# Patient Record
Sex: FEMALE | Race: WHITE | NOT HISPANIC OR LATINO | Employment: PART TIME | URBAN - NONMETROPOLITAN AREA
[De-identification: names, ages, dates, MRNs, and addresses within clinical notes are randomized per-mention and may not be internally consistent; named-entity substitution may affect disease eponyms.]

---

## 2017-01-23 ENCOUNTER — HISTORY (OUTPATIENT)
Dept: FAMILY MEDICINE | Facility: OTHER | Age: 32
End: 2017-01-23

## 2017-01-23 ENCOUNTER — OFFICE VISIT - GICH (OUTPATIENT)
Dept: FAMILY MEDICINE | Facility: OTHER | Age: 32
End: 2017-01-23

## 2017-01-23 DIAGNOSIS — R05.9 COUGH: ICD-10-CM

## 2017-01-23 DIAGNOSIS — J32.9 CHRONIC SINUSITIS: ICD-10-CM

## 2017-01-23 DIAGNOSIS — B97.89 OTHER VIRAL AGENTS AS THE CAUSE OF DISEASES CLASSIFIED ELSEWHERE: ICD-10-CM

## 2017-01-23 DIAGNOSIS — A08.4 VIRAL INTESTINAL INFECTION: ICD-10-CM

## 2017-01-23 DIAGNOSIS — J45.20 MILD INTERMITTENT ASTHMA, UNCOMPLICATED: ICD-10-CM

## 2017-01-26 ENCOUNTER — HISTORY (OUTPATIENT)
Dept: FAMILY MEDICINE | Facility: OTHER | Age: 32
End: 2017-01-26

## 2017-01-26 ENCOUNTER — OFFICE VISIT - GICH (OUTPATIENT)
Dept: FAMILY MEDICINE | Facility: OTHER | Age: 32
End: 2017-01-26

## 2017-01-26 DIAGNOSIS — R19.7 DIARRHEA: ICD-10-CM

## 2017-01-26 DIAGNOSIS — J01.10 ACUTE FRONTAL SINUSITIS: ICD-10-CM

## 2017-01-26 DIAGNOSIS — R05.9 COUGH: ICD-10-CM

## 2017-01-26 DIAGNOSIS — J01.00 ACUTE MAXILLARY SINUSITIS: ICD-10-CM

## 2017-01-26 DIAGNOSIS — J01.20 ACUTE ETHMOIDAL SINUSITIS: ICD-10-CM

## 2018-01-03 NOTE — PATIENT INSTRUCTIONS
Patient Information     Patient Name MRN Liza Flynn [id number] Female 1985      Patient Instructions by Jennifer Peña PA-C at 2017  9:45 AM     Author:  Jennifer Peña PA-C Service:  (none) Author Type:  PHYS- Physician Assistant     Filed:  2017 10:10 AM Encounter Date:  2017 Status:  Signed     :  Jennifer Peña PA-C (PHYS- Physician Assistant)            Antibiotic has been sent to pharmacy. Please take full course of antibiotic even if symptoms have completely resolved. This helps prevent against antibiotic resistance.     Patient prescribed antibiotics. Monitor for any fevers or chills. Return in 7-10 days if not feeling better. Please call clinic with any questions or concerns. Please take in a lot of fluids and get rest. Return with any change or worsening of symptoms.    May use symptomatic care with tylenol or ibuprofen. Sudafed or mucinex work well for congestion. May use cough syrup or cough drops.  Using a humidifier works well to break up the congestion. You can also sleep propped up on a couple pillows to decrease symptoms at night. A nettipot works well to decrease nasal congestion.    Use a Neti Pot/sinus flush (Scott Med Sinus Rinse) 3 times daily to irrigate sinuses/mucosal tissue.     Sudafed or mucinex work well for congestion.   If you choose pseudoephedrine, use for only 5-7 days AS DIRECTED. Speak to your pharmacist if you have any concerns about your medications. May also use decongestant nasal spray, but only for 3 days MAXIMUM.    You will need to be evaluated if you start to experience:  Fever higher than 102.5 F (39.2 C)   Sudden and severe pain in the face and head   Trouble seeing or seeing double   Trouble thinking clearly   Swelling or redness around 1 or both eyes   Trouble breathing or a stiff neck    * If you are a smoker, try to quit *    Call  or go to the emergency room if you:  Have trouble breathing   Are drooling because  you cannot swallow your saliva   Have swelling of the neck or tongue   Cannot move your neck or have trouble opening your mouth

## 2018-01-03 NOTE — NURSING NOTE
Patient Information     Patient Name MRN Sex Liza Brink [id number] Female 1985      Nursing Note by Gerardo Weems at 2017  9:45 AM     Author:  Gerardo Weems Service:  (none) Author Type:  (none)     Filed:  2017 10:02 AM Encounter Date:  2017 Status:  Signed     :  Gerardo Weems            Pt here today for cough and some diarrhea. Pt was seen on  and is getting worse. Pt will need a work note today as well. Pt ears are sore and pt has itchy eyes.  Gerardo Weems LPN .............2017  9:51 AM

## 2018-01-03 NOTE — PROGRESS NOTES
Patient Information     Patient Name MRN Sex Liza Brink [id number] Female 1985      Progress Notes by Jennifer Peña PA-C at 2017  9:45 AM     Author:  Jennifer Peña PA-C Service:  (none) Author Type:  PHYS- Physician Assistant     Filed:  2017 10:16 AM Encounter Date:  2017 Status:  Signed     :  Jennifer Peña PA-C (PHYS- Physician Assistant)            Nursing Notes:   Gerardo Weems  2017 10:02 AM  Signed  Pt here today for cough and some diarrhea. Pt was seen on  and is getting worse. Pt will need a work note today as well. Pt ears are sore and pt has itchy eyes.  Gerardo Weems LPN .............2017  9:51 AM      HPI:    Liza Chaudhari is a 31 y.o. female who presents for cough and some diarrhea. Pt was seen on  and is getting worse. Pt will need a work note today as well. Pt ears are sore and pt has itchy eyes. Cold, clammy, ear pain. Felt warm.  HA.  Stuffy. Sinus pain.  Runny nose.  Coughing.  Chest tight.  Some SOB.  No vomiting.  Nausea.  Little diarrhea.  Diarrhea 3/day.  Ears irritable. Vomited previously. Upset stomach. Sinus pain persistent last few days. Congestion in lungs - coughing. Hx asthma. Coughing up phlegm. No new foods, meds. Need work note. No dysuria, hematuria, blood in stool.     No change in visual acuity, no photophobia, no severe eye pain.    No significant prior ophthalmological history. Patient  does not wear contact lenses.    No past medical history on file.    No past surgical history on file.    Social History        Substance Use Topics          Smoking status:   Current Every Day Smoker      Packs/day:  0.75      Types:  Cigarettes      Smokeless tobacco:   Never Used      Alcohol use   0.0 oz/week     0 Standard drinks or equivalent per week        Comment: rare         Current Outpatient Prescriptions       Medication  Sig Dispense Refill     albuterol HFA 90 mcg/actuation inhaler Inhale 2 Puffs by mouth every  "4 hours if needed. 1 Inhaler 1     No current facility-administered medications for this visit.      Medications have been reviewed by me and are current to the best of my knowledge and ability.      No Known Allergies    REVIEW OF SYSTEMS:  Refer to HPI.    EXAM:   Vitals:    /80  Pulse 76  Temp 96.8  F (36  C) (Tympanic)  Ht 1.615 m (5' 3.6\")  Wt 80.9 kg (178 lb 6.4 oz)  LMP 01/19/2017  SpO2 97%  BMI 31.01 kg/m2    General Appearance: Pleasant, alert, appropriate appearance for age. No acute distress  Eye Exam:  Normal external eye, conjunctiva, lids, cornea. RICARDO. No foreign body noted in eye or beneath eyelids. No periorbital cellulitis or swelling. The corneas are clear. No drainage or pustule.    Ear Exam: Normal TM's bilaterally, grey, translucent, bony landmarks appreciated.   Left/Right TM: Effusion is not present. TM is not bulging. There is no pus appreciated.    Normal auditory canals and external ears. Non-tender.   OroPharynx Exam:  Erythematous posterior pharynx with no exudates. Sinus pain upon palpation of frontal, ethmoid, and maxillary sinuses.  Chest/Respiratory Exam: Normal chest wall and respirations. Clear to auscultation. No retractions appreciated.  Cardiovascular Exam: Regular rate and rhythm. S1, S2, no murmur, click, gallop, or rubs.  Lymphatic Exam: ACLN.  Skin: no rash or abnormalities  Gastrointestinal Exam: Soft, no masses or organomegaly. Abdomen mildly tender throughout 4 quadrants. Normal BS x 4.  No CVA tenderness to palpation. No rebound tenderness or guarding.  Psychiatric Exam: Alert and oriented - appropriate affect.    PHQ Depression Screen  Date of PHQ exam: 01/26/17  Over the last 2 weeks, how often have you been bothered by any of the following problems?  1. Little interest or pleasure in doing things: 0 - Not at all  2. Feeling down, depressed, or hopeless: 0 - Not at all       No results found for this or any previous visit.    ASSESSMENT AND PLAN:      ICD-10-CM  "   1. Acute non-recurrent frontal sinusitis J01.10 azithromycin (ZITHROMAX) 250 mg tablet   2. Acute non-recurrent ethmoidal sinusitis J01.20 azithromycin (ZITHROMAX) 250 mg tablet   3. Acute non-recurrent maxillary sinusitis J01.00 azithromycin (ZITHROMAX) 250 mg tablet   4. Cough R05 azithromycin (ZITHROMAX) 250 mg tablet   5. Diarrhea, unspecified type R19.7        Started on azithromycin for an acute sinus infection.     Diarrhea -   Try small amounts of food and drink frequently. Offer a bland diet. Advance as tolerated.  Avoid dairy products the first day. You may add yogurt tomorrow as the first dairy product.    Try the BRAT diet (bread, bananas, rice, applesauce, tea, toast).  This is a very bland diet which should not irritate your colon.  Hold off on spicy foods, red sauces, mexican or chinese food.    Call or return to clinic as needed if your symptoms worsen or fail to improve as anticipated.     Should I see a doctor or nurse about my stomach ache? -- Most people do not need to see a doctor or nurse for a stomach ache. But you should see your doctor or nurse if:  ?You have bloody bowel movements, diarrhea, or vomiting  ?Your pain is severe and lasts more than an hour or comes and goes for more than 24 hours  ?You cannot eat or drink for hours  ?You have a fever higher than 102 F (39 C)  ?You lose a lot of weight without trying to, or lose interest in food     Patient Instructions   Antibiotic has been sent to pharmacy. Please take full course of antibiotic even if symptoms have completely resolved. This helps prevent against antibiotic resistance.     Patient prescribed antibiotics. Monitor for any fevers or chills. Return in 7-10 days if not feeling better. Please call clinic with any questions or concerns. Please take in a lot of fluids and get rest. Return with any change or worsening of symptoms.    May use symptomatic care with tylenol or ibuprofen. Sudafed or mucinex work well for congestion. May  use cough syrup or cough drops.  Using a humidifier works well to break up the congestion. You can also sleep propped up on a couple pillows to decrease symptoms at night. A nettipot works well to decrease nasal congestion.    Use a Neti Pot/sinus flush (Scott Med Sinus Rinse) 3 times daily to irrigate sinuses/mucosal tissue.     Sudafed or mucinex work well for congestion.   If you choose pseudoephedrine, use for only 5-7 days AS DIRECTED. Speak to your pharmacist if you have any concerns about your medications. May also use decongestant nasal spray, but only for 3 days MAXIMUM.    You will need to be evaluated if you start to experience:  Fever higher than 102.5 F (39.2 C)   Sudden and severe pain in the face and head   Trouble seeing or seeing double   Trouble thinking clearly   Swelling or redness around 1 or both eyes   Trouble breathing or a stiff neck    * If you are a smoker, try to quit *    Call 9-1-1 or go to the emergency room if you:  Have trouble breathing   Are drooling because you cannot swallow your saliva   Have swelling of the neck or tongue   Cannot move your neck or have trouble opening your mouth        Jennifer Peña PA-C..................1/26/2017 9:56 AM

## 2018-01-03 NOTE — PATIENT INSTRUCTIONS
Patient Information     Patient Name MRN Liza Flynn [id number] Female 1985      Patient Instructions by Jennifer Peña PA-C at 2017 10:15 AM     Author:  Jennifer Peña PA-C Service:  (none) Author Type:  PHYS- Physician Assistant     Filed:  2017 10:39 AM Encounter Date:  2017 Status:  Signed     :  Jennifer Peña PA-C (PHYS- Physician Assistant)            Viral stomach infection -   Try small amounts of food and drink frequently. Offer a bland diet. Advance as tolerated.  Avoid dairy products the first day. You may add yogurt tomorrow as the first dairy product.    Try the BRAT diet (bread, bananas, rice, applesauce, tea, toast).  This is a very bland diet which should not irritate your colon.  Hold off on spicy foods, red sauces, mexican or chinese food.    Call or return to clinic as needed if your symptoms worsen or fail to improve as anticipated.     Should I see a doctor or nurse about my stomach ache? -- Most people do not need to see a doctor or nurse for a stomach ache. But you should see your doctor or nurse if:  ?You have bloody bowel movements, diarrhea, or vomiting  ?Your pain is severe and lasts more than an hour or comes and goes for more than 24 hours  ?You cannot eat or drink for hours  ?You have a fever higher than 102 F (39 C)  ?You lose a lot of weight without trying to, or lose interest in food     Viral sinus infection -   Symptoms due to virus. No antibiotic is needed at this time. Symptoms typically worse on days 3-4 and then begin improving each day. If symptoms begin worsening or fail to improve after 10 days, return to clinic for reevaluation.     May use symptomatic care with tylenol or ibuprofen. Sudafed or mucinex work well for congestion. May use cough syrup or cough drops.  Using a humidifier works well to break up the congestion. You can also sleep propped up on a couple pillows to decrease symptoms at night.    Please take tylenol as  needed up to 4 times daily.  Treat symptomatically with warm salt water gargles.  Lozenges, Tylenol, Advil or Aleve as needed. Frequent swallows of cool liquid.  Oatmeal coats the throat and some patients find it soothes the pain. Encouraged warm teas or fluids with honey.     If you have sinus congestion -  Use a Neti Pot/sinus flush (Scott Med Sinus Rinse) 3 times daily to irrigate sinuses/mucosal tissue.     Monitor for any fevers or chills. Return in 7-10 days if not feeling better. Please call clinic with any questions or concerns. Return to clinic with change/worsening of symptoms.   Encouraged fluids and rest.    Call 9-1-1 or go to the emergency room if you:  Have trouble breathing   Are drooling because you cannot swallow your saliva   Have swelling of the neck or tongue   Cannot move your neck or have trouble opening your mouth

## 2018-01-03 NOTE — NURSING NOTE
Patient Information     Patient Name MRN Liza Flynn [id number] Female 1985      Nursing Note by Gerardo Weems at 2017 10:15 AM     Author:  Gerardo Weems Service:  (none) Author Type:  (none)     Filed:  2017 10:31 AM Encounter Date:  2017 Status:  Signed     :  Gerardo Weems            Pt here today for flu like symptoms. This has been going on for a week. Pt has ha diarrhea but seems to be getting better, pt vomited this morning. She feels aches and has had upper raspatory issues as well. Pt would like an Rx for albuterol.  Gerardo Weems LPN .............2017  10:23 AM

## 2018-01-03 NOTE — PROGRESS NOTES
Patient Information     Patient Name MRN Sex     Liza Chaudhari [id number] Female 1985      Progress Notes by Jennifer Peña PA-C at 2017 10:15 AM     Author:  Jennifer Peña PA-C Service:  (none) Author Type:  PHYS- Physician Assistant     Filed:  2017 10:46 AM Encounter Date:  2017 Status:  Signed     :  Jennifer Peña PA-C (PHYS- Physician Assistant)            Nursing Notes:   Gerardo Weems  2017 10:31 AM  Signed  Pt here today for flu like symptoms. This has been going on for a week. Pt has ha diarrhea but seems to be getting better, pt vomited this morning. She feels aches and has had upper raspatory issues as well. Pt would like an Rx for albuterol.  Gerardo Weems LPN .............2017  10:23 AM      HPI:    Liza Chaudhari is a 31 y.o. female who presents for flu like symptoms. This has been going on for a week. Pt has ha diarrhea but seems to be getting better, pt vomited this morning. She feels aches and has had upper respiratory issues as well. Pt would like an Rx for albuterol. Upset stomach.  Warm sweats, chills. Slight HA.  Stomach symptoms started this morning.  Symptoms off and on x 1 month.  Still have slight congestion in lungs - getting better. Hx asthma. Out of albuterol . Sometimes use a couple times daily. Little ear pain/irritation. Little sinus pain/pressure. Coughing up green phlegm x couple days. Diarrhea - couple times this morning.  No new foods, meds. [place] [employer] [place] yesterday.  Need work note.     No past medical history on file.    No past surgical history on file.    Social History        Substance Use Topics          Smoking status:   Current Every Day Smoker      Packs/day:  0.75      Types:  Cigarettes      Smokeless tobacco:   Never Used      Alcohol use   0.0 oz/week     0 Standard drinks or equivalent per week        Comment: rare         No current outpatient prescriptions on file.     No current  "facility-administered medications for this visit.      Medications have been reviewed by me and are current to the best of my knowledge and ability.      No Known Allergies    REVIEW OF SYSTEMS:  Refer to HPI.    EXAM:   Vitals:    /68  Pulse 64  Temp 97.8  F (36.6  C) (Oral)  Ht 1.615 m (5' 3.6\")  Wt 79.4 kg (175 lb)  LMP 01/19/2017  BMI 30.42 kg/m2    General Appearance: Pleasant, alert, appropriate appearance for age. No acute distress  Ear Exam: Normal TM's bilaterally, grey, translucent, bony landmarks appreciated.   Left/Right TM: Effusion is not present. TM is not bulging. There is no pus appreciated.    Normal auditory canals and external ears. Non-tender.   Nose Exam: Normal external nose, mucus membranes, and septum.  OroPharynx Exam:  Non-erythematous posterior pharynx with no exudates. No sinus pain upon palpation of frontal, ethmoid, and maxillary sinuses.  Chest/Respiratory Exam: Normal chest wall and respirations. Clear to auscultation. No retractions appreciated.  Cardiovascular Exam: Regular rate and rhythm. S1, S2, no murmur, click, gallop, or rubs.  Lymphatic Exam: Neg.  Skin: no rash or abnormalities  Gastrointestinal Exam: Soft, no masses or organomegaly. Abdomen non-tender. Normal BS x 4.  No CVA tenderness to palpation. No rebound tenderness or guarding.  Psychiatric Exam: Alert and oriented - appropriate affect.    PHQ Depression Screen  Date of PHQ exam: 01/23/17  Over the last 2 weeks, how often have you been bothered by any of the following problems?  1. Little interest or pleasure in doing things: 0 - Not at all  2. Feeling down, depressed, or hopeless: 0 - Not at all       LABS:    No results found for this visit on 01/23/17.    ASSESSMENT AND PLAN:      ICD-10-CM    1. Viral gastroenteritis A08.4    2. Cough R05 albuterol HFA 90 mcg/actuation inhaler   3. Viral sinusitis J32.9      B97.89    4. Mild intermittent asthma without complication J45.20 albuterol HFA 90 mcg/actuation " inhaler       Refilled albuterol inhaler.   Symptoms viral. No antibiotics warranted at this time.   Viral gastroenteritis and sinus infection.   Return to clinic with change/worsening of symptoms.   Gave work note.     Patient Instructions   Viral stomach infection -   Try small amounts of food and drink frequently. Offer a bland diet. Advance as tolerated.  Avoid dairy products the first day. You may add yogurt tomorrow as the first dairy product.    Try the BRAT diet (bread, bananas, rice, applesauce, tea, toast).  This is a very bland diet which should not irritate your colon.  Hold off on spicy foods, red sauces, mexican or chinese food.    Call or return to clinic as needed if your symptoms worsen or fail to improve as anticipated.     Should I see a doctor or nurse about my stomach ache? -- Most people do not need to see a doctor or nurse for a stomach ache. But you should see your doctor or nurse if:  ?You have bloody bowel movements, diarrhea, or vomiting  ?Your pain is severe and lasts more than an hour or comes and goes for more than 24 hours  ?You cannot eat or drink for hours  ?You have a fever higher than 102 F (39 C)  ?You lose a lot of weight without trying to, or lose interest in food     Viral sinus infection -   Symptoms due to virus. No antibiotic is needed at this time. Symptoms typically worse on days 3-4 and then begin improving each day. If symptoms begin worsening or fail to improve after 10 days, return to clinic for reevaluation.     May use symptomatic care with tylenol or ibuprofen. Sudafed or mucinex work well for congestion. May use cough syrup or cough drops.  Using a humidifier works well to break up the congestion. You can also sleep propped up on a couple pillows to decrease symptoms at night.    Please take tylenol as needed up to 4 times daily.  Treat symptomatically with warm salt water gargles.  Lozenges, Tylenol, Advil or Aleve as needed. Frequent swallows of cool liquid.   Oatmeal coats the throat and some patients find it soothes the pain. Encouraged warm teas or fluids with honey.     If you have sinus congestion -  Use a Neti Pot/sinus flush (Scott Med Sinus Rinse) 3 times daily to irrigate sinuses/mucosal tissue.     Monitor for any fevers or chills. Return in 7-10 days if not feeling better. Please call clinic with any questions or concerns. Return to clinic with change/worsening of symptoms.   Encouraged fluids and rest.    Call 9-1-1 or go to the emergency room if you:  Have trouble breathing   Are drooling because you cannot swallow your saliva   Have swelling of the neck or tongue   Cannot move your neck or have trouble opening your mouth          Jennifer Peña PA-C..................1/23/2017 10:30 AM

## 2018-01-16 PROBLEM — J45.20 MILD INTERMITTENT ASTHMA WITHOUT COMPLICATION: Status: ACTIVE | Noted: 2017-01-23

## 2018-01-16 RX ORDER — ALBUTEROL SULFATE 90 UG/1
2 AEROSOL, METERED RESPIRATORY (INHALATION)
COMMUNITY
Start: 2017-01-23

## 2018-01-25 VITALS
HEIGHT: 64 IN | TEMPERATURE: 96.8 F | DIASTOLIC BLOOD PRESSURE: 80 MMHG | BODY MASS INDEX: 30.46 KG/M2 | HEART RATE: 76 BPM | OXYGEN SATURATION: 97 % | WEIGHT: 178.4 LBS | SYSTOLIC BLOOD PRESSURE: 112 MMHG

## 2018-01-25 VITALS
HEIGHT: 64 IN | HEART RATE: 64 BPM | SYSTOLIC BLOOD PRESSURE: 130 MMHG | WEIGHT: 175 LBS | BODY MASS INDEX: 29.88 KG/M2 | DIASTOLIC BLOOD PRESSURE: 68 MMHG | TEMPERATURE: 97.8 F

## 2018-07-23 NOTE — PROGRESS NOTES
Patient Information     Patient Name  Liza Chaudhari MRN  [id number] Sex  Female   1985      Letter by Jennifer Peañ PA-C at      Author:  Jennifer Peña PA-C Service:  (none) Author Type:  (none)    Filed:   Encounter Date:  2017 Status:  (Other)           Liza Chaudhari  [address]  [facility] [address]          2017      CERTIFICATE TO RETURN TO WORK      Liza Chaudhari has been under my care from 2017  through 2017 and is able to return to work on 2017.      Remarks: Please excuse from work today 2017.     Sincerely,  Jennifer Peña PA-C ....................  2017   10:10 AM

## 2018-07-23 NOTE — PROGRESS NOTES
Patient Information     Patient Name  Liza Chaudhari MRN  [id number] Sex  Female   1985      Letter by Jennifer Peña PA-C at      Author:  Jennifer Peña PA-C Service:  (none) Author Type:  (none)    Filed:   Encounter Date:  2017 Status:  (Other)           Liza Chaudhari  [address]  [facility] [address]          2017      CERTIFICATE TO RETURN TO WORK      Liza Chaudhari has been under my care from 2017  through 2017 and is able to return to work on 2017.      Remarks: Please excuse from work on 2017. Patient was diagnosed with a viral stomach infection.     Sincerely,  Jennifer Peña PA-C ....................  2017   10:40 AM

## 2020-05-09 ENCOUNTER — HOSPITAL ENCOUNTER (EMERGENCY)
Facility: CLINIC | Age: 35
Discharge: HOME | End: 2020-05-10
Attending: FAMILY MEDICINE | Admitting: FAMILY MEDICINE
Payer: COMMERCIAL

## 2020-05-09 VITALS
OXYGEN SATURATION: 98 % | BODY MASS INDEX: 32.16 KG/M2 | DIASTOLIC BLOOD PRESSURE: 70 MMHG | SYSTOLIC BLOOD PRESSURE: 134 MMHG | TEMPERATURE: 98.4 F | WEIGHT: 185 LBS

## 2020-05-09 DIAGNOSIS — L23.7 CONTACT DERMATITIS DUE TO POISON IVY: ICD-10-CM

## 2020-05-09 PROCEDURE — 99284 EMERGENCY DEPT VISIT MOD MDM: CPT | Mod: Z6 | Performed by: NURSE PRACTITIONER

## 2020-05-09 PROCEDURE — 99283 EMERGENCY DEPT VISIT LOW MDM: CPT | Performed by: NURSE PRACTITIONER

## 2020-05-09 RX ORDER — HYDROXYZINE PAMOATE 25 MG/1
25 CAPSULE ORAL 4 TIMES DAILY PRN
Qty: 20 CAPSULE | Refills: 0 | Status: SHIPPED | OUTPATIENT
Start: 2020-05-09

## 2020-05-09 RX ORDER — PREDNISONE 10 MG/1
TABLET ORAL
Qty: 32 TABLET | Refills: 0 | Status: SHIPPED | OUTPATIENT
Start: 2020-05-09 | End: 2020-05-19

## 2020-05-09 NOTE — ED AVS SNAPSHOT
[facility] Emergency Department  [address] [place] [address]  Phone:  [phone]  Fax:  [phone]                                    Liza Chaudhari   MRN: [id number]    Department:  [facility] Emergency Department   Date of Visit:  5/9/2020           After Visit Summary Signature Page    I have received my discharge instructions, and my questions have been answered. I have discussed any challenges I see with this plan with the nurse or doctor.    ..........................................................................................................................................  Patient/Patient Representative Signature      ..........................................................................................................................................  Patient Representative Print Name and Relationship to Patient    ..................................................               ................................................  Date                                   Time    ..........................................................................................................................................  Reviewed by Signature/Title    ...................................................              ..............................................  Date                                               Time          22EPIC Rev 08/18

## 2020-05-10 NOTE — ED PROVIDER NOTES
History     Chief Complaint   Patient presents with     Poison Landy     HPI  Liza Chaudhari is a 35 year old female who presents to the ED today with itching and rash to bilateral arms that is spreading up the arms, patient was working out in the woods and thinks that she has poison ivy.  Patient has been taking Benadryl and using calamine lotion with some relief in her symptoms.  Patient denies any other complaints today.    Allergies:  No Known Allergies    Problem List:    Patient Active Problem List    Diagnosis Date Noted     Mild intermittent asthma without complication 01/23/2017     Priority: Medium     Back pain 06/02/2015     Priority: Medium     Impingement syndrome, shoulder 06/02/2015     Priority: Medium     CARDIOVASCULAR SCREENING; LDL GOAL LESS THAN 160 02/21/2014     Priority: Medium     Hyperhydrosis disorder      Priority: Medium     Carpal tunnel syndrome      Priority: Medium        Past Medical History:    History reviewed. No pertinent past medical history.    Past Surgical History:    History reviewed. No pertinent surgical history.    Family History:    History reviewed. No pertinent family history.    Social History:  Marital Status:  Single [1]  Social History     Tobacco Use     Smoking status: Current Every Day Smoker     Packs/day: 0.75     Types: Cigarettes     Smokeless tobacco: Never Used   Substance Use Topics     Alcohol use: Yes     Alcohol/week: 0.0 standard drinks     Comment: Alcoholic Drinks/day: rare     Drug use: Unknown     Types: Other     Comment: Drug use: No        Medications:    hydrOXYzine (VISTARIL) 25 MG capsule  predniSONE (DELTASONE) 10 MG tablet  albuterol (PROAIR HFA, PROVENTIL HFA, VENTOLIN HFA) 108 (90 BASE) MCG/ACT inhaler  albuterol (PROAIR HFA, PROVENTIL HFA, VENTOLIN HFA) 108 (90 BASE) MCG/ACT inhaler  albuterol (PROAIR HFA/PROVENTIL HFA/VENTOLIN HFA) 108 (90 BASE) MCG/ACT Inhaler  gabapentin (NEURONTIN) 300 MG capsule  HYDROcodone-acetaminophen (NORCO)  5-325 MG per tablet          Review of Systems   All other systems reviewed and are negative.      Physical Exam   BP: 134/70  Temp: 98.4  F (36.9  C)  Weight: 83.9 kg (185 lb)  SpO2: 98 %      Physical Exam  Constitutional:       Appearance: Normal appearance.   HENT:      Head: Normocephalic.      Nose: Nose normal.      Mouth/Throat:      Mouth: Mucous membranes are moist.   Eyes:      Extraocular Movements: Extraocular movements intact.   Neck:      Musculoskeletal: Normal range of motion.   Cardiovascular:      Rate and Rhythm: Normal rate.   Pulmonary:      Effort: Pulmonary effort is normal.   Musculoskeletal: Normal range of motion.   Skin:     General: Skin is warm.      Capillary Refill: Capillary refill takes less than 2 seconds.      Findings: Rash (Maculopapular rash to bilateral forearms consistent with contact dermatitis, no petechiae, some breakdown from itching.  No urticaria) present.   Neurological:      General: No focal deficit present.      Mental Status: She is alert.   Psychiatric:         Mood and Affect: Mood normal.         ED Course        Procedures          No results found for this or any previous visit (from the past 24 hour(s)).    Medications - No data to display    Assessments & Plan (with Medical Decision Making)  Contact dermatitis secondary to poison ivy exposure.  Start prednisone taper, patient prescribed Vistaril in place of Benadryl as needed for itching.  Continue with calamine lotion/hydrocortisone cream/Zanfel as needed.  Follow-up with primary care on Tuesday as scheduled, reasons to return to the emergency room discussed, patient agreeable to plan of care discharged stable condition.     I have reviewed the nursing notes.    I have reviewed the findings, diagnosis, plan and need for follow up with the patient.      New Prescriptions    HYDROXYZINE (VISTARIL) 25 MG CAPSULE    Take 1 capsule (25 mg) by mouth 4 times daily as needed for itching    PREDNISONE (DELTASONE) 10  MG TABLET    Take 4 tablets daily for 5 days,  take 2 tablets daily for 3 days, take 1 tablet daily for 3 days, take half a tablet for 3 days.       Final diagnoses:   Contact dermatitis due to poison ivy       5/9/2020   [facility] EMERGENCY DEPARTMENT     Luz Treviño, APRN CNP  05/09/20 4204

## 2020-05-10 NOTE — ED TRIAGE NOTES
Pt with poison ivy like rash on hands and forearms. Was doing some landscaping the other day and rash is spreading quickly.

## 2022-04-16 PROCEDURE — 99284 EMERGENCY DEPT VISIT MOD MDM: CPT | Performed by: FAMILY MEDICINE

## 2022-04-16 PROCEDURE — 99284 EMERGENCY DEPT VISIT MOD MDM: CPT | Mod: 25 | Performed by: FAMILY MEDICINE

## 2022-04-17 ENCOUNTER — HOSPITAL ENCOUNTER (EMERGENCY)
Facility: CLINIC | Age: 37
Discharge: HOME | End: 2022-04-17
Attending: FAMILY MEDICINE | Admitting: FAMILY MEDICINE
Payer: COMMERCIAL

## 2022-04-17 VITALS
OXYGEN SATURATION: 96 % | RESPIRATION RATE: 16 BRPM | SYSTOLIC BLOOD PRESSURE: 110 MMHG | WEIGHT: 197.2 LBS | BODY MASS INDEX: 34.28 KG/M2 | DIASTOLIC BLOOD PRESSURE: 58 MMHG | HEART RATE: 92 BPM | TEMPERATURE: 98.4 F

## 2022-04-17 DIAGNOSIS — H53.8 BLURRED VISION: ICD-10-CM

## 2022-04-17 DIAGNOSIS — R29.898 BILATERAL LEG WEAKNESS: ICD-10-CM

## 2022-04-17 DIAGNOSIS — R51.9 INTRACTABLE HEADACHE, UNSPECIFIED CHRONICITY PATTERN, UNSPECIFIED HEADACHE TYPE: ICD-10-CM

## 2022-04-17 LAB
ALBUMIN SERPL-MCNC: 3.5 G/DL (ref 3.4–5)
ALBUMIN UR-MCNC: NEGATIVE MG/DL
ALP SERPL-CCNC: 77 U/L (ref 40–150)
ALT SERPL W P-5'-P-CCNC: 16 U/L (ref 0–50)
ANION GAP SERPL CALCULATED.3IONS-SCNC: 5 MMOL/L (ref 3–14)
APPEARANCE UR: ABNORMAL
AST SERPL W P-5'-P-CCNC: 12 U/L (ref 0–45)
BACTERIA #/AREA URNS HPF: ABNORMAL /HPF
BASOPHILS # BLD AUTO: 0.1 10E3/UL (ref 0–0.2)
BASOPHILS NFR BLD AUTO: 0 %
BILIRUB SERPL-MCNC: 0.2 MG/DL (ref 0.2–1.3)
BILIRUB UR QL STRIP: NEGATIVE
BUN SERPL-MCNC: 20 MG/DL (ref 7–30)
CALCIUM SERPL-MCNC: 8.3 MG/DL (ref 8.5–10.1)
CHLORIDE BLD-SCNC: 111 MMOL/L (ref 94–109)
CO2 SERPL-SCNC: 22 MMOL/L (ref 20–32)
COLOR UR AUTO: YELLOW
CREAT SERPL-MCNC: 0.82 MG/DL (ref 0.52–1.04)
CRP SERPL-MCNC: <2.9 MG/L (ref 0–8)
EOSINOPHIL # BLD AUTO: 0.6 10E3/UL (ref 0–0.7)
EOSINOPHIL NFR BLD AUTO: 4 %
ERYTHROCYTE [DISTWIDTH] IN BLOOD BY AUTOMATED COUNT: 12.8 % (ref 10–15)
ERYTHROCYTE [SEDIMENTATION RATE] IN BLOOD BY WESTERGREN METHOD: 10 MM/HR (ref 0–20)
FLUAV RNA SPEC QL NAA+PROBE: NEGATIVE
FLUBV RNA RESP QL NAA+PROBE: NEGATIVE
GFR SERPL CREATININE-BSD FRML MDRD: >90 ML/MIN/1.73M2
GLUCOSE BLD-MCNC: 95 MG/DL (ref 70–99)
GLUCOSE UR STRIP-MCNC: NEGATIVE MG/DL
HCT VFR BLD AUTO: 36.5 % (ref 35–47)
HGB BLD-MCNC: 12.5 G/DL (ref 11.7–15.7)
HGB UR QL STRIP: NEGATIVE
IMM GRANULOCYTES # BLD: 0.1 10E3/UL
IMM GRANULOCYTES NFR BLD: 1 %
KETONES UR STRIP-MCNC: NEGATIVE MG/DL
LEUKOCYTE ESTERASE UR QL STRIP: NEGATIVE
LYMPHOCYTES # BLD AUTO: 3 10E3/UL (ref 0.8–5.3)
LYMPHOCYTES NFR BLD AUTO: 21 %
MCH RBC QN AUTO: 33.1 PG (ref 26.5–33)
MCHC RBC AUTO-ENTMCNC: 34.2 G/DL (ref 31.5–36.5)
MCV RBC AUTO: 97 FL (ref 78–100)
MONOCYTES # BLD AUTO: 0.8 10E3/UL (ref 0–1.3)
MONOCYTES NFR BLD AUTO: 6 %
MUCOUS THREADS #/AREA URNS LPF: PRESENT /LPF
NEUTROPHILS # BLD AUTO: 9.7 10E3/UL (ref 1.6–8.3)
NEUTROPHILS NFR BLD AUTO: 68 %
NITRATE UR QL: NEGATIVE
NRBC # BLD AUTO: 0 10E3/UL
NRBC BLD AUTO-RTO: 0 /100
PH UR STRIP: 5 [PH] (ref 5–7)
PLATELET # BLD AUTO: 276 10E3/UL (ref 150–450)
POTASSIUM BLD-SCNC: 3.8 MMOL/L (ref 3.4–5.3)
PROT SERPL-MCNC: 6.7 G/DL (ref 6.8–8.8)
RBC # BLD AUTO: 3.78 10E6/UL (ref 3.8–5.2)
RBC URINE: <1 /HPF
SARS-COV-2 RNA RESP QL NAA+PROBE: NEGATIVE
SODIUM SERPL-SCNC: 138 MMOL/L (ref 133–144)
SP GR UR STRIP: 1.02 (ref 1–1.03)
SQUAMOUS EPITHELIAL: 4 /HPF
TSH SERPL DL<=0.005 MIU/L-ACNC: 1.65 MU/L (ref 0.4–4)
UROBILINOGEN UR STRIP-MCNC: NORMAL MG/DL
WBC # BLD AUTO: 14.2 10E3/UL (ref 4–11)
WBC URINE: 3 /HPF

## 2022-04-17 PROCEDURE — 87636 SARSCOV2 & INF A&B AMP PRB: CPT | Performed by: FAMILY MEDICINE

## 2022-04-17 PROCEDURE — 96375 TX/PRO/DX INJ NEW DRUG ADDON: CPT | Performed by: FAMILY MEDICINE

## 2022-04-17 PROCEDURE — 84443 ASSAY THYROID STIM HORMONE: CPT | Performed by: FAMILY MEDICINE

## 2022-04-17 PROCEDURE — 96361 HYDRATE IV INFUSION ADD-ON: CPT | Performed by: FAMILY MEDICINE

## 2022-04-17 PROCEDURE — 96365 THER/PROPH/DIAG IV INF INIT: CPT | Performed by: FAMILY MEDICINE

## 2022-04-17 PROCEDURE — 81001 URINALYSIS AUTO W/SCOPE: CPT | Performed by: FAMILY MEDICINE

## 2022-04-17 PROCEDURE — 258N000003 HC RX IP 258 OP 636: Performed by: FAMILY MEDICINE

## 2022-04-17 PROCEDURE — 36415 COLL VENOUS BLD VENIPUNCTURE: CPT | Performed by: FAMILY MEDICINE

## 2022-04-17 PROCEDURE — 85652 RBC SED RATE AUTOMATED: CPT | Performed by: FAMILY MEDICINE

## 2022-04-17 PROCEDURE — 85004 AUTOMATED DIFF WBC COUNT: CPT | Performed by: FAMILY MEDICINE

## 2022-04-17 PROCEDURE — 80053 COMPREHEN METABOLIC PANEL: CPT | Performed by: FAMILY MEDICINE

## 2022-04-17 PROCEDURE — 86140 C-REACTIVE PROTEIN: CPT | Performed by: FAMILY MEDICINE

## 2022-04-17 PROCEDURE — 250N000011 HC RX IP 250 OP 636: Performed by: FAMILY MEDICINE

## 2022-04-17 RX ORDER — DEXAMETHASONE SODIUM PHOSPHATE 10 MG/ML
10 INJECTION, SOLUTION INTRAMUSCULAR; INTRAVENOUS ONCE
Status: COMPLETED | OUTPATIENT
Start: 2022-04-17 | End: 2022-04-17

## 2022-04-17 RX ORDER — KETOROLAC TROMETHAMINE 15 MG/ML
15 INJECTION, SOLUTION INTRAMUSCULAR; INTRAVENOUS ONCE
Status: COMPLETED | OUTPATIENT
Start: 2022-04-17 | End: 2022-04-17

## 2022-04-17 RX ORDER — MAGNESIUM SULFATE 1 G/100ML
1 INJECTION INTRAVENOUS ONCE
Status: COMPLETED | OUTPATIENT
Start: 2022-04-17 | End: 2022-04-17

## 2022-04-17 RX ADMIN — DEXAMETHASONE SODIUM PHOSPHATE 10 MG: 10 INJECTION, SOLUTION INTRAMUSCULAR; INTRAVENOUS at 02:18

## 2022-04-17 RX ADMIN — SODIUM CHLORIDE 1000 ML: 9 INJECTION, SOLUTION INTRAVENOUS at 02:19

## 2022-04-17 RX ADMIN — MAGNESIUM SULFATE HEPTAHYDRATE 1 G: 1 INJECTION, SOLUTION INTRAVENOUS at 02:19

## 2022-04-17 RX ADMIN — PROCHLORPERAZINE EDISYLATE 5 MG: 5 INJECTION INTRAMUSCULAR; INTRAVENOUS at 02:19

## 2022-04-17 RX ADMIN — KETOROLAC TROMETHAMINE 15 MG: 15 INJECTION, SOLUTION INTRAMUSCULAR; INTRAVENOUS at 02:19

## 2022-04-17 NOTE — ED PROVIDER NOTES
[facility] ED Provider Note   Patient: Liza Chaudhari  MRN #:  [id number]  Date of Visit: April 17, 2022    CC:     Chief Complaint   Patient presents with     Leg Problem     Eye Problem     HPI:  Liza Chaudhari is a 37 year old female who presented to the emergency department with bilateral leg weakness that has given out a couple of times, associated with increased headache across the front with blurry vision and fatigue.  Patient also has some numbness in the legs and arms.  She has a history of fibromyalgia, neuropathy, and spinal fusion.  She has been seeing an oncologist because of abnormal elevated white blood cells.  She has had this for a number of years, and was recently told that she should consider seeing a rheumatologist to consider possible autoimmune disease.  Patient was concerned because of the constellation of some new and worsening symptoms that she has had and does not know what might be triggering it.  She has not had a fever, chills, sore throat, cough.  She states that she tested positive for Covid in January.  Her overall pain has slightly increased.  She has chronic headaches and was prescribed medication to abort the headache at the onset but has not been keen on taking pills.  Patient also reports that she has had some occasional palpitations.    Problem List:  Patient Active Problem List    Diagnosis Date Noted     Mild intermittent asthma without complication 01/23/2017     Priority: Medium     Back pain 06/02/2015     Priority: Medium     Impingement syndrome, shoulder 06/02/2015     Priority: Medium     CARDIOVASCULAR SCREENING; LDL GOAL LESS THAN 160 02/21/2014     Priority: Medium     Hyperhydrosis disorder      Priority: Medium     Carpal tunnel syndrome      Priority: Medium       No past medical history on file.    MEDS: albuterol (PROAIR HFA, PROVENTIL HFA, VENTOLIN HFA) 108 (90 BASE) MCG/ACT inhaler  albuterol  (PROAIR HFA, PROVENTIL HFA, VENTOLIN HFA) 108 (90 BASE) MCG/ACT inhaler  albuterol (PROAIR HFA/PROVENTIL HFA/VENTOLIN HFA) 108 (90 BASE) MCG/ACT Inhaler  gabapentin (NEURONTIN) 300 MG capsule  HYDROcodone-acetaminophen (NORCO) 5-325 MG per tablet  hydrOXYzine (VISTARIL) 25 MG capsule        ALLERGIES:    Allergies   Allergen Reactions     Tizanidine Swelling and Other (See Comments)     Face and eyes. Nausea   Swelling of Face and eyes. Nausea          No past surgical history on file.    Social History     Tobacco Use     Smoking status: Current Every Day Smoker     Packs/day: 0.75     Types: Cigarettes     Smokeless tobacco: Never Used   Substance Use Topics     Alcohol use: Yes     Alcohol/week: 0.0 standard drinks     Comment: Alcoholic Drinks/day: rare     Drug use: Unknown     Types: Other     Comment: Drug use: No         Review of Systems   Except as noted in HPI, all other systems were reviewed and are negative    Physical Exam     Vitals were reviewed  Patient Vitals for the past 12 hrs:   BP Temp Temp src Pulse Resp SpO2 Weight   04/17/22 0230 96/53 -- -- 69 -- 97 % --   04/17/22 0200 94/72 -- -- 77 -- 99 % --   04/17/22 0155 108/65 -- -- 62 -- 98 % --   04/17/22 0130 (!) 88/53 -- -- 78 -- 93 % --   04/17/22 0100 106/59 -- -- 79 -- 94 % --   04/17/22 0008 (!) 133/92 98.4  F (36.9  C) Oral 102 16 97 % 89.4 kg (197 lb 3.2 oz)     GENERAL APPEARANCE: Alert, tearful, laying on her right side  FACE: normal facies; no asymmetry  EYES: Pupils are equal; very minimal proptosis; extraocular muscles are intact; funduscopic exam is benign  HENT: normal external exam; oral exam is benign; good dentition  NECK: no adenopathy or asymmetry; no thyroid masses or nodules  RESP: normal respiratory effort; clear breath sounds bilaterally  CV: regular rate and rhythm; no significant murmurs, gallops or rubs  ABD: soft, obese, no tenderness; no rebound or guarding; bowel sounds are normal  MS: no gross deformities noted;  normal muscle tone.  EXT: No calf tenderness or pitting edema  SKIN: no worrisome rash  NEURO: no facial droop; no focal deficits, speech is normal; brisk bilateral deep tendon reflexes at the patella at 3+/4+; strength is intact to the lower extremities.  Sensation is intact and symmetrical bilaterally to the lower extremities        Available Lab/Imaging Results     Results for orders placed or performed during the hospital encounter of 04/17/22 (from the past 24 hour(s))   CBC with platelets differential    Narrative    The following orders were created for panel order CBC with platelets differential.  Procedure                               Abnormality         Status                     ---------                               -----------         ------                     CBC with platelets and d...[[id number]]  Abnormal            Final result                 Please view results for these tests on the individual orders.   Comprehensive metabolic panel   Result Value Ref Range    Sodium 138 133 - 144 mmol/L    Potassium 3.8 3.4 - 5.3 mmol/L    Chloride 111 (H) 94 - 109 mmol/L    Carbon Dioxide (CO2) 22 20 - 32 mmol/L    Anion Gap 5 3 - 14 mmol/L    Urea Nitrogen 20 7 - 30 mg/dL    Creatinine 0.82 0.52 - 1.04 mg/dL    Calcium 8.3 (L) 8.5 - 10.1 mg/dL    Glucose 95 70 - 99 mg/dL    Alkaline Phosphatase 77 40 - 150 U/L    AST 12 0 - 45 U/L    ALT 16 0 - 50 U/L    Protein Total 6.7 (L) 6.8 - 8.8 g/dL    Albumin 3.5 3.4 - 5.0 g/dL    Bilirubin Total 0.2 0.2 - 1.3 mg/dL    GFR Estimate >90 >60 mL/min/1.73m2   CRP inflammation   Result Value Ref Range    CRP Inflammation <2.9 0.0 - 8.0 mg/L   Erythrocyte sedimentation rate auto   Result Value Ref Range    Erythrocyte Sedimentation Rate 10 0 - 20 mm/hr   TSH   Result Value Ref Range    TSH 1.65 0.40 - 4.00 mU/L   CBC with platelets and differential   Result Value Ref Range    WBC Count 14.2 (H) 4.0 - 11.0 10e3/uL    RBC Count 3.78 (L) 3.80 - 5.20 10e6/uL    Hemoglobin  12.5 11.7 - 15.7 g/dL    Hematocrit 36.5 35.0 - 47.0 %    MCV 97 78 - 100 fL    MCH 33.1 (H) 26.5 - 33.0 pg    MCHC 34.2 31.5 - 36.5 g/dL    RDW 12.8 10.0 - 15.0 %    Platelet Count 276 150 - 450 10e3/uL    % Neutrophils 68 %    % Lymphocytes 21 %    % Monocytes 6 %    % Eosinophils 4 %    % Basophils 0 %    % Immature Granulocytes 1 %    NRBCs per 100 WBC 0 <1 /100    Absolute Neutrophils 9.7 (H) 1.6 - 8.3 10e3/uL    Absolute Lymphocytes 3.0 0.8 - 5.3 10e3/uL    Absolute Monocytes 0.8 0.0 - 1.3 10e3/uL    Absolute Eosinophils 0.6 0.0 - 0.7 10e3/uL    Absolute Basophils 0.1 0.0 - 0.2 10e3/uL    Absolute Immature Granulocytes 0.1 <=0.4 10e3/uL    Absolute NRBCs 0.0 10e3/uL   Symptomatic; Unknown Influenza A/B & SARS-CoV2 (COVID-19) Virus PCR Multiplex Nose    Specimen: Nose; Swab   Result Value Ref Range    Influenza A PCR Negative Negative    Influenza B PCR Negative Negative    SARS CoV2 PCR Negative Negative    Narrative    Testing was performed using the giuseppe SARS-CoV-2 & Influenza A/B Assay on the giuseppe [employer] System. This test should be ordered for the detection of SARS-CoV-2 and influenza viruses in individuals who meet clinical and/or epidemiological criteria. Test performance is unknown in asymptomatic patients. This test is for in vitro diagnostic use under the FDA EUA for laboratories certified under CLIA to perform moderate and/or high complexity testing. This test has not been FDA cleared or approved. A negative result does not rule out the presence of PCR inhibitors in the specimen or target RNA in concentration below the limit of detection for the assay. If only one viral target is positive but coinfection with multiple targets is suspected, the sample should be re-tested with another FDA cleared, approved or authorized test, if coinfection would change clinical management. [facility] are certified under the Clinical Laboratory Improvement Amendments of 1988 (CLIA-88) as  qualified  to perform moderate and/or high complexity laboratory testing.   UA with Microscopic reflex to Culture    Specimen: Urine, Clean Catch   Result Value Ref Range    Color Urine Yellow Colorless, Straw, Light Yellow, Yellow    Appearance Urine Slightly Cloudy (A) Clear    Glucose Urine Negative Negative mg/dL    Bilirubin Urine Negative Negative    Ketones Urine Negative Negative mg/dL    Specific Gravity Urine 1.024 1.003 - 1.035    Blood Urine Negative Negative    pH Urine 5.0 5.0 - 7.0    Protein Albumin Urine Negative Negative mg/dL    Urobilinogen Urine Normal Normal, 2.0 mg/dL    Nitrite Urine Negative Negative    Leukocyte Esterase Urine Negative Negative    Bacteria Urine Few (A) None Seen /HPF    Mucus Urine Present (A) None Seen /LPF    RBC Urine <1 <=2 /HPF    WBC Urine 3 <=5 /HPF    Squamous Epithelials Urine 4 (H) <=1 /HPF    Narrative    Urine Culture not indicated              Impression     Final diagnoses:   Blurred vision   Bilateral leg weakness   Intractable headache         ED Course & Medical Decision Making   Liza Chaudhari is a 37 year old female who presented to the emergency department with combination of symptoms that made her concerned that there was something worrisome.  Patient has been experiencing fatigue for some time, but more recently she has had some bilateral leg weakness, and states that twice today her legs almost gave out.  She has chronic headaches, but her headache pain is worsened across the front and it is associated with blurred vision which she cannot explain.  She has a history of fibromyalgia, neuropathy, and has had chronic back pain.  She has had a spinal fusion.  Patient had been seeing an oncologist because of her unexplained elevated white blood count.  There has not been any formal diagnosis, and she is being referred to see rheumatologist.  She was not sure whether her symptoms reflected something serious and wanted to come in to get checked.  She has not had any  strokelike symptoms such as facial droop, speech difficulty, unilateral weakness.  She however reports some numbness and tingling, and blurred vision.  Patient was seen shortly after arrival.  Temperature was 98.4, blood pressure 133/92 with a couple of borderline low blood pressures of 94/72 and 96/53.  Patient has a normal neurologic exam.  She reports having had COVID back in January.  I contemplated the possibility that she is still having some long COVID symptoms.  Her work-up today reveals a white blood count of 14.2, with normal differential.  Hemoglobin and platelet counts are normal.  Comprehensive metabolic panel is essentially normal, sed rate is 10, CRP is less than 2.9, TSH is 1.65.  Urinalysis reveals less than 1 red blood cell and 3 white blood cells.  Influenza and SARS-CoV-2 PCR tests are negative.  Patient received medications for intractable pain/headache including Toradol, magnesium, Compazine, and Decadron.  Patient was asleep when I came back to visit with her.  Patient was informed that her blood work were reassuring for the most part.  Her white blood count is still elevated and this has been chronic.  I recommended that she follow-up with an eye specialist for having her eyes checked due to her blurred vision.  I do not suspect acute CVA at this time.  Patient will need to follow-up with her primary care provider in the next 3-4 days.  Return to the ED at anytime with new or worsening symptoms.      Written after-visit summary and instructions were given at the time of discharge.    Follow up Plan:   Clinic, [facility]  [address] [address] [address]  [phone]    In 3 days      [facility] Emergency Dept  [address] [place] [address] [phone]    If symptoms worsen      Discharge Instructions:   We did not find a worrisome cause for your symptoms of blurred vision and bilateral leg weakness.  Your blood work  were reassuring.  Your white blood count was elevated but there is no signs of infection.  Please follow-up with your eye specialist to have your eyes examined.  See the attached handout on blurred vision.  You received IV fluids, Toradol, magnesium, Compazine and Decadron to treat your headache and generalized pain.  Please follow-up with your primary care provider in the next 3-4 days if your symptoms persist.       Disclaimer: This note consists of words and symbols derived from keyboarding and dictation using voice recognition software.  As a result, there may be errors that have gone undetected.  Please consider this when interpreting information found in this note.       Crystal Doherty MD  04/17/22 2039

## 2022-04-17 NOTE — ED TRIAGE NOTES
PT comes in w/ leg weakness, blurry vision, fatigue, migraine, numbness in legs, and arms. Has hx of fibromyalgia, neuropathy, spinal fusion.

## 2022-04-17 NOTE — DISCHARGE INSTRUCTIONS
We did not find a worrisome cause for your symptoms of blurred vision and bilateral leg weakness.  Your blood work were reassuring.  Your white blood count was elevated but there is no signs of infection.  Please follow-up with your eye specialist to have your eyes examined.  See the attached handout on blurred vision.  You received IV fluids, Toradol, magnesium, Compazine and Decadron to treat your headache and generalized pain.  Please follow-up with your primary care provider in the next 3-4 days if your symptoms persist.

## 2022-07-28 ENCOUNTER — HOSPITAL ENCOUNTER (EMERGENCY)
Facility: CLINIC | Age: 37
Discharge: HOME | End: 2022-07-28
Attending: FAMILY MEDICINE | Admitting: FAMILY MEDICINE
Payer: COMMERCIAL

## 2022-07-28 ENCOUNTER — APPOINTMENT (OUTPATIENT)
Dept: GENERAL RADIOLOGY | Facility: CLINIC | Age: 37
End: 2022-07-28
Attending: FAMILY MEDICINE
Payer: COMMERCIAL

## 2022-07-28 VITALS
WEIGHT: 211 LBS | HEART RATE: 92 BPM | BODY MASS INDEX: 36.68 KG/M2 | RESPIRATION RATE: 18 BRPM | TEMPERATURE: 98.6 F | OXYGEN SATURATION: 98 % | SYSTOLIC BLOOD PRESSURE: 135 MMHG | DIASTOLIC BLOOD PRESSURE: 80 MMHG

## 2022-07-28 DIAGNOSIS — R07.89 ATYPICAL CHEST PAIN: ICD-10-CM

## 2022-07-28 LAB
ALBUMIN SERPL-MCNC: 3.6 G/DL (ref 3.4–5)
ALP SERPL-CCNC: 88 U/L (ref 40–150)
ALT SERPL W P-5'-P-CCNC: 17 U/L (ref 0–50)
ANION GAP SERPL CALCULATED.3IONS-SCNC: 6 MMOL/L (ref 3–14)
AST SERPL W P-5'-P-CCNC: 10 U/L (ref 0–45)
BASOPHILS # BLD AUTO: 0.1 10E3/UL (ref 0–0.2)
BASOPHILS NFR BLD AUTO: 1 %
BILIRUB SERPL-MCNC: 0.1 MG/DL (ref 0.2–1.3)
BUN SERPL-MCNC: 17 MG/DL (ref 7–30)
CALCIUM SERPL-MCNC: 8.5 MG/DL (ref 8.5–10.1)
CHLORIDE BLD-SCNC: 106 MMOL/L (ref 94–109)
CO2 SERPL-SCNC: 24 MMOL/L (ref 20–32)
CREAT SERPL-MCNC: 0.77 MG/DL (ref 0.52–1.04)
EOSINOPHIL # BLD AUTO: 0.6 10E3/UL (ref 0–0.7)
EOSINOPHIL NFR BLD AUTO: 5 %
ERYTHROCYTE [DISTWIDTH] IN BLOOD BY AUTOMATED COUNT: 12.2 % (ref 10–15)
GFR SERPL CREATININE-BSD FRML MDRD: >90 ML/MIN/1.73M2
GLUCOSE BLD-MCNC: 101 MG/DL (ref 70–99)
HCT VFR BLD AUTO: 38.6 % (ref 35–47)
HGB BLD-MCNC: 13.2 G/DL (ref 11.7–15.7)
IMM GRANULOCYTES # BLD: 0.1 10E3/UL
IMM GRANULOCYTES NFR BLD: 1 %
LIPASE SERPL-CCNC: 66 U/L (ref 73–393)
LYMPHOCYTES # BLD AUTO: 2.8 10E3/UL (ref 0.8–5.3)
LYMPHOCYTES NFR BLD AUTO: 23 %
MCH RBC QN AUTO: 32.5 PG (ref 26.5–33)
MCHC RBC AUTO-ENTMCNC: 34.2 G/DL (ref 31.5–36.5)
MCV RBC AUTO: 95 FL (ref 78–100)
MONOCYTES # BLD AUTO: 0.6 10E3/UL (ref 0–1.3)
MONOCYTES NFR BLD AUTO: 5 %
NEUTROPHILS # BLD AUTO: 8.2 10E3/UL (ref 1.6–8.3)
NEUTROPHILS NFR BLD AUTO: 65 %
NRBC # BLD AUTO: 0 10E3/UL
NRBC BLD AUTO-RTO: 0 /100
NT-PROBNP SERPL-MCNC: 19 PG/ML (ref 0–450)
PLATELET # BLD AUTO: 317 10E3/UL (ref 150–450)
POTASSIUM BLD-SCNC: 3.4 MMOL/L (ref 3.4–5.3)
PROT SERPL-MCNC: 7.2 G/DL (ref 6.8–8.8)
RBC # BLD AUTO: 4.06 10E6/UL (ref 3.8–5.2)
SODIUM SERPL-SCNC: 136 MMOL/L (ref 133–144)
TROPONIN I SERPL HS-MCNC: 4 NG/L
WBC # BLD AUTO: 12.4 10E3/UL (ref 4–11)

## 2022-07-28 PROCEDURE — 83690 ASSAY OF LIPASE: CPT | Performed by: FAMILY MEDICINE

## 2022-07-28 PROCEDURE — 99284 EMERGENCY DEPT VISIT MOD MDM: CPT | Mod: 25 | Performed by: FAMILY MEDICINE

## 2022-07-28 PROCEDURE — 96374 THER/PROPH/DIAG INJ IV PUSH: CPT | Performed by: FAMILY MEDICINE

## 2022-07-28 PROCEDURE — 84484 ASSAY OF TROPONIN QUANT: CPT | Performed by: FAMILY MEDICINE

## 2022-07-28 PROCEDURE — 71045 X-RAY EXAM CHEST 1 VIEW: CPT

## 2022-07-28 PROCEDURE — 83880 ASSAY OF NATRIURETIC PEPTIDE: CPT | Performed by: FAMILY MEDICINE

## 2022-07-28 PROCEDURE — 85025 COMPLETE CBC W/AUTO DIFF WBC: CPT | Performed by: FAMILY MEDICINE

## 2022-07-28 PROCEDURE — 250N000011 HC RX IP 250 OP 636: Performed by: FAMILY MEDICINE

## 2022-07-28 PROCEDURE — 80053 COMPREHEN METABOLIC PANEL: CPT | Performed by: FAMILY MEDICINE

## 2022-07-28 PROCEDURE — 36415 COLL VENOUS BLD VENIPUNCTURE: CPT | Performed by: FAMILY MEDICINE

## 2022-07-28 PROCEDURE — 93010 ELECTROCARDIOGRAM REPORT: CPT | Performed by: FAMILY MEDICINE

## 2022-07-28 RX ORDER — KETOROLAC TROMETHAMINE 30 MG/ML
30 INJECTION, SOLUTION INTRAMUSCULAR; INTRAVENOUS ONCE
Status: COMPLETED | OUTPATIENT
Start: 2022-07-28 | End: 2022-07-28

## 2022-07-28 RX ADMIN — KETOROLAC TROMETHAMINE 30 MG: 30 INJECTION, SOLUTION INTRAMUSCULAR at 13:45

## 2022-07-28 NOTE — ED PROVIDER NOTES
History     Chief Complaint   Patient presents with     Chest Pain     HPI  Liza Chaudhari is a 37 year old female who presents with chest pain this been going off and on over the past week or 2.  Pain sometimes will last a few seconds, sometimes hours.  Pain is always on the left side.  Sometimes it radiates down her arm.  She is noticed some increasing swelling of her lower extremities.  Patient has fibromyalgia and was not sure if it could be related to this.  Patient try to get into the clinic but they told her she needed to immediately go to the emergency department.    Allergies:  Allergies   Allergen Reactions     Tizanidine Swelling and Other (See Comments)     Face and eyes. Nausea   Swelling of Face and eyes. Nausea          Problem List:    Patient Active Problem List    Diagnosis Date Noted     Mild intermittent asthma without complication 01/23/2017     Priority: Medium     Back pain 06/02/2015     Priority: Medium     Impingement syndrome, shoulder 06/02/2015     Priority: Medium     CARDIOVASCULAR SCREENING; LDL GOAL LESS THAN 160 02/21/2014     Priority: Medium     Hyperhydrosis disorder      Priority: Medium     Carpal tunnel syndrome      Priority: Medium        Past Medical History:    No past medical history on file.    Past Surgical History:    No past surgical history on file.    Family History:    No family history on file.    Social History:  Marital Status:  Single [1]  Social History     Tobacco Use     Smoking status: Current Every Day Smoker     Packs/day: 0.75     Types: Cigarettes     Smokeless tobacco: Never Used   Substance Use Topics     Alcohol use: Yes     Alcohol/week: 0.0 standard drinks     Comment: Alcoholic Drinks/day: rare     Drug use: Unknown     Types: Other     Comment: Drug use: No        Medications:    albuterol (PROAIR HFA, PROVENTIL HFA, VENTOLIN HFA) 108 (90 BASE) MCG/ACT inhaler  albuterol (PROAIR HFA, PROVENTIL HFA, VENTOLIN HFA) 108 (90 BASE) MCG/ACT  inhaler  albuterol (PROAIR HFA/PROVENTIL HFA/VENTOLIN HFA) 108 (90 BASE) MCG/ACT Inhaler  gabapentin (NEURONTIN) 300 MG capsule  HYDROcodone-acetaminophen (NORCO) 5-325 MG per tablet  hydrOXYzine (VISTARIL) 25 MG capsule          Review of Systems   All other systems reviewed and are negative.      Physical Exam   BP: 134/83  Pulse: 92  Temp: 98.6  F (37  C)  Resp: 18  Weight: 95.7 kg (211 lb)  SpO2: 98 %      Physical Exam  Vitals and nursing note reviewed.   Constitutional:       General: She is not in acute distress.     Appearance: She is well-developed. She is not diaphoretic.   HENT:      Head: Normocephalic and atraumatic.      Nose: Nose normal.      Mouth/Throat:      Pharynx: No oropharyngeal exudate.   Eyes:      Conjunctiva/sclera: Conjunctivae normal.   Cardiovascular:      Rate and Rhythm: Normal rate and regular rhythm.      Heart sounds: Normal heart sounds. No murmur heard.    No friction rub.   Pulmonary:      Effort: Pulmonary effort is normal. No respiratory distress.      Breath sounds: Normal breath sounds. No stridor. No wheezing or rales.   Abdominal:      General: Bowel sounds are normal. There is no distension.      Palpations: Abdomen is soft. There is no mass.      Tenderness: There is no abdominal tenderness. There is no guarding.   Musculoskeletal:         General: No tenderness. Normal range of motion.      Cervical back: Normal range of motion and neck supple.   Skin:     General: Skin is warm and dry.      Capillary Refill: Capillary refill takes less than 2 seconds.      Findings: No erythema.   Neurological:      Mental Status: She is alert and oriented to person, place, and time.   Psychiatric:         Judgment: Judgment normal.         ED Course                 Procedures      Results for orders placed or performed during the hospital encounter of 07/28/22 (from the past 24 hour(s))   CBC with platelets differential    Narrative    The following orders were created for panel  order CBC with platelets differential.  Procedure                               Abnormality         Status                     ---------                               -----------         ------                     CBC with platelets and d...[[id number]]  Abnormal            Final result                 Please view results for these tests on the individual orders.   Comprehensive metabolic panel   Result Value Ref Range    Sodium 136 133 - 144 mmol/L    Potassium 3.4 3.4 - 5.3 mmol/L    Chloride 106 94 - 109 mmol/L    Carbon Dioxide (CO2) 24 20 - 32 mmol/L    Anion Gap 6 3 - 14 mmol/L    Urea Nitrogen 17 7 - 30 mg/dL    Creatinine 0.77 0.52 - 1.04 mg/dL    Calcium 8.5 8.5 - 10.1 mg/dL    Glucose 101 (H) 70 - 99 mg/dL    Alkaline Phosphatase 88 40 - 150 U/L    AST 10 0 - 45 U/L    ALT 17 0 - 50 U/L    Protein Total 7.2 6.8 - 8.8 g/dL    Albumin 3.6 3.4 - 5.0 g/dL    Bilirubin Total 0.1 (L) 0.2 - 1.3 mg/dL    GFR Estimate >90 >60 mL/min/1.73m2   Lipase   Result Value Ref Range    Lipase 66 (L) 73 - 393 U/L   Troponin I   Result Value Ref Range    Troponin I High Sensitivity 4 <54 ng/L   Nt probnp inpatient (BNP)   Result Value Ref Range    N terminal Pro BNP Inpatient 19 0 - 450 pg/mL   CBC with platelets and differential   Result Value Ref Range    WBC Count 12.4 (H) 4.0 - 11.0 10e3/uL    RBC Count 4.06 3.80 - 5.20 10e6/uL    Hemoglobin 13.2 11.7 - 15.7 g/dL    Hematocrit 38.6 35.0 - 47.0 %    MCV 95 78 - 100 fL    MCH 32.5 26.5 - 33.0 pg    MCHC 34.2 31.5 - 36.5 g/dL    RDW 12.2 10.0 - 15.0 %    Platelet Count 317 150 - 450 10e3/uL    % Neutrophils 65 %    % Lymphocytes 23 %    % Monocytes 5 %    % Eosinophils 5 %    % Basophils 1 %    % Immature Granulocytes 1 %    NRBCs per 100 WBC 0 <1 /100    Absolute Neutrophils 8.2 1.6 - 8.3 10e3/uL    Absolute Lymphocytes 2.8 0.8 - 5.3 10e3/uL    Absolute Monocytes 0.6 0.0 - 1.3 10e3/uL    Absolute Eosinophils 0.6 0.0 - 0.7 10e3/uL    Absolute Basophils 0.1 0.0 - 0.2 10e3/uL     Absolute Immature Granulocytes 0.1 <=0.4 10e3/uL    Absolute NRBCs 0.0 10e3/uL   XR Chest Port 1 View    Narrative    XR CHEST PORT 1 VIEW 7/28/2022 1:57 PM    HISTORY: chest pain    COMPARISON: 6/14/2015      Impression    IMPRESSION: No acute cardiac pulmonary process.    ROSMERY VALIENTE MD         SYSTEM ID:  [id number]       Medications   ketorolac (TORADOL) injection 30 mg (has no administration in time range)     Labs are reviewed and were mostly unremarkable.  White count was just slightly elevated.  Troponin was negative.  I do not think this is likely anything to do with patient's heart, this could be more muscular or possibly related to her fibromyalgia.  I think it is safe to discharge the patient home at this time.  Recommend follow-up with her doctor if things or not improving over the next few days.    Assessments & Plan (with Medical Decision Making)  Atypical chest pain     I have reviewed the nursing notes.    I have reviewed the findings, diagnosis, plan and need for follow up with the patient.              7/28/2022   [facility] EMERGENCY DEPT     Alex Ordaz MD  07/28/22 [id number]

## 2022-07-28 NOTE — ED TRIAGE NOTES
Patient has been having intermittent chest pain over the past few weeks. She denies pain today.      Triage Assessment     Row Name 07/28/22 1312       Triage Assessment (Adult)    Airway WDL WDL       Respiratory WDL    Respiratory WDL WDL       Skin Circulation/Temperature WDL    Skin Circulation/Temperature WDL WDL       Cardiac WDL    Cardiac WDL WDL       Peripheral/Neurovascular WDL    Peripheral Neurovascular WDL WDL       Cognitive/Neuro/Behavioral WDL    Cognitive/Neuro/Behavioral WDL WDL

## 2023-07-19 ENCOUNTER — APPOINTMENT (OUTPATIENT)
Dept: CT IMAGING | Facility: CLINIC | Age: 38
End: 2023-07-19
Attending: EMERGENCY MEDICINE
Payer: COMMERCIAL

## 2023-07-19 ENCOUNTER — HOSPITAL ENCOUNTER (EMERGENCY)
Facility: CLINIC | Age: 38
Discharge: HOME | End: 2023-07-19
Attending: EMERGENCY MEDICINE | Admitting: EMERGENCY MEDICINE
Payer: COMMERCIAL

## 2023-07-19 VITALS
OXYGEN SATURATION: 96 % | HEIGHT: 63 IN | WEIGHT: 216.4 LBS | SYSTOLIC BLOOD PRESSURE: 115 MMHG | DIASTOLIC BLOOD PRESSURE: 74 MMHG | BODY MASS INDEX: 38.34 KG/M2 | HEART RATE: 71 BPM | TEMPERATURE: 97.2 F | RESPIRATION RATE: 18 BRPM

## 2023-07-19 DIAGNOSIS — E27.9 ADRENAL NODULE (H): ICD-10-CM

## 2023-07-19 DIAGNOSIS — R10.13 EPIGASTRIC PAIN: ICD-10-CM

## 2023-07-19 LAB
ALBUMIN SERPL BCG-MCNC: 4 G/DL (ref 3.5–5.2)
ALP SERPL-CCNC: 100 U/L (ref 35–104)
ALT SERPL W P-5'-P-CCNC: 19 U/L (ref 0–50)
ANION GAP SERPL CALCULATED.3IONS-SCNC: 13 MMOL/L (ref 7–15)
AST SERPL W P-5'-P-CCNC: 18 U/L (ref 0–45)
BASOPHILS # BLD AUTO: 0.1 10E3/UL (ref 0–0.2)
BASOPHILS NFR BLD AUTO: 0 %
BILIRUB SERPL-MCNC: 0.2 MG/DL
BUN SERPL-MCNC: 17.8 MG/DL (ref 6–20)
CALCIUM SERPL-MCNC: 9.3 MG/DL (ref 8.6–10)
CHLORIDE SERPL-SCNC: 104 MMOL/L (ref 98–107)
CREAT SERPL-MCNC: 0.87 MG/DL (ref 0.51–0.95)
DEPRECATED HCO3 PLAS-SCNC: 22 MMOL/L (ref 22–29)
EOSINOPHIL # BLD AUTO: 0.6 10E3/UL (ref 0–0.7)
EOSINOPHIL NFR BLD AUTO: 5 %
ERYTHROCYTE [DISTWIDTH] IN BLOOD BY AUTOMATED COUNT: 12.7 % (ref 10–15)
GFR SERPL CREATININE-BSD FRML MDRD: 87 ML/MIN/1.73M2
GLUCOSE SERPL-MCNC: 84 MG/DL (ref 70–99)
HCG SERPL QL: NEGATIVE
HCT VFR BLD AUTO: 37.7 % (ref 35–47)
HGB BLD-MCNC: 12.7 G/DL (ref 11.7–15.7)
IMM GRANULOCYTES # BLD: 0.1 10E3/UL
IMM GRANULOCYTES NFR BLD: 0 %
LIPASE SERPL-CCNC: 16 U/L (ref 13–60)
LYMPHOCYTES # BLD AUTO: 3 10E3/UL (ref 0.8–5.3)
LYMPHOCYTES NFR BLD AUTO: 25 %
MCH RBC QN AUTO: 31.1 PG (ref 26.5–33)
MCHC RBC AUTO-ENTMCNC: 33.7 G/DL (ref 31.5–36.5)
MCV RBC AUTO: 92 FL (ref 78–100)
MONOCYTES # BLD AUTO: 0.8 10E3/UL (ref 0–1.3)
MONOCYTES NFR BLD AUTO: 7 %
NEUTROPHILS # BLD AUTO: 7.5 10E3/UL (ref 1.6–8.3)
NEUTROPHILS NFR BLD AUTO: 63 %
NRBC # BLD AUTO: 0 10E3/UL
NRBC BLD AUTO-RTO: 0 /100
PLATELET # BLD AUTO: 305 10E3/UL (ref 150–450)
POTASSIUM SERPL-SCNC: 3.9 MMOL/L (ref 3.4–5.3)
PROT SERPL-MCNC: 7.1 G/DL (ref 6.4–8.3)
RBC # BLD AUTO: 4.09 10E6/UL (ref 3.8–5.2)
SODIUM SERPL-SCNC: 139 MMOL/L (ref 136–145)
WBC # BLD AUTO: 11.9 10E3/UL (ref 4–11)

## 2023-07-19 PROCEDURE — 83690 ASSAY OF LIPASE: CPT | Performed by: EMERGENCY MEDICINE

## 2023-07-19 PROCEDURE — 258N000003 HC RX IP 258 OP 636: Performed by: EMERGENCY MEDICINE

## 2023-07-19 PROCEDURE — 84703 CHORIONIC GONADOTROPIN ASSAY: CPT | Performed by: EMERGENCY MEDICINE

## 2023-07-19 PROCEDURE — 96360 HYDRATION IV INFUSION INIT: CPT | Performed by: EMERGENCY MEDICINE

## 2023-07-19 PROCEDURE — 36415 COLL VENOUS BLD VENIPUNCTURE: CPT | Performed by: EMERGENCY MEDICINE

## 2023-07-19 PROCEDURE — 250N000011 HC RX IP 250 OP 636: Performed by: EMERGENCY MEDICINE

## 2023-07-19 PROCEDURE — 99285 EMERGENCY DEPT VISIT HI MDM: CPT | Mod: 25 | Performed by: EMERGENCY MEDICINE

## 2023-07-19 PROCEDURE — 80053 COMPREHEN METABOLIC PANEL: CPT | Performed by: EMERGENCY MEDICINE

## 2023-07-19 PROCEDURE — 250N000009 HC RX 250: Performed by: EMERGENCY MEDICINE

## 2023-07-19 PROCEDURE — 85025 COMPLETE CBC W/AUTO DIFF WBC: CPT | Performed by: EMERGENCY MEDICINE

## 2023-07-19 PROCEDURE — 99284 EMERGENCY DEPT VISIT MOD MDM: CPT | Performed by: EMERGENCY MEDICINE

## 2023-07-19 PROCEDURE — 74177 CT ABD & PELVIS W/CONTRAST: CPT

## 2023-07-19 RX ORDER — FAMOTIDINE 20 MG/1
20 TABLET, FILM COATED ORAL 2 TIMES DAILY
Qty: 60 TABLET | Refills: 0 | Status: SHIPPED | OUTPATIENT
Start: 2023-07-19 | End: 2023-08-18

## 2023-07-19 RX ORDER — IOPAMIDOL 755 MG/ML
500 INJECTION, SOLUTION INTRAVASCULAR ONCE
Status: COMPLETED | OUTPATIENT
Start: 2023-07-19 | End: 2023-07-19

## 2023-07-19 RX ADMIN — SODIUM CHLORIDE 1000 ML: 9 INJECTION, SOLUTION INTRAVENOUS at 20:38

## 2023-07-19 RX ADMIN — SODIUM CHLORIDE 60 ML: 9 INJECTION, SOLUTION INTRAVENOUS at 20:49

## 2023-07-19 RX ADMIN — IOPAMIDOL 100 ML: 755 INJECTION, SOLUTION INTRAVENOUS at 20:49

## 2023-07-19 ASSESSMENT — ACTIVITIES OF DAILY LIVING (ADL): ADLS_ACUITY_SCORE: 35

## 2023-07-20 NOTE — DISCHARGE INSTRUCTIONS
I recommend continued with your GI appointments and other specialist appointments.  I have started you on Pepcid to see if this will help with your symptoms.  Return here if your symptoms worsen or develop new symptoms you find concerning.    As we discussed CT scan did see some adrenal nodules that need to be followed up as an outpatient to ensure that they are benign.

## 2023-07-20 NOTE — ED TRIAGE NOTES
Pt states that she has been having right and left sided abdominal pain for a few months. Pain has been getting worse lately especially after eating. Nausea.      Triage Assessment       Row Name 07/19/23 1918       Triage Assessment (Adult)    Airway WDL WDL       Respiratory WDL    Respiratory WDL WDL       Skin Circulation/Temperature WDL    Skin Circulation/Temperature WDL WDL       Cardiac WDL    Cardiac WDL WDL       Peripheral/Neurovascular WDL    Peripheral Neurovascular WDL WDL       Cognitive/Neuro/Behavioral WDL    Cognitive/Neuro/Behavioral WDL WDL

## 2023-07-20 NOTE — ED PROVIDER NOTES
History     chief complaint  HPI  Patient is a 38-year-old female with a history of irritable bowel syndrome, PCOS who is presenting with worsening abdominal pain for days in the setting of several months of worsening abdominal pain.  The new pain seems to be epigastric and postprandial.  She is also having pain in her lower abdomen and right upper quadrant.  Feeling little nauseous.  No vomiting.  Intermittent blood in her stool.  Intermittent subjective fevers.    Review of Systems:  All organ systems below were reviewed and are negative unless indicated in the HPI.    Constitutional  Eyes  ENT  Respiratory  Cardiovascular  Gastrointestinal  Genitourinary  Musculoskeletal  Skin  Neuro    Allergies:  Allergies   Allergen Reactions     Tizanidine Swelling and Other (See Comments)     Face and eyes. Nausea   Swelling of Face and eyes. Nausea          Problem List:    Patient Active Problem List    Diagnosis Date Noted     Mild intermittent asthma without complication 01/23/2017     Priority: Medium     Back pain 06/02/2015     Priority: Medium     Impingement syndrome, shoulder 06/02/2015     Priority: Medium     CARDIOVASCULAR SCREENING; LDL GOAL LESS THAN 160 02/21/2014     Priority: Medium     Hyperhydrosis disorder      Priority: Medium     Carpal tunnel syndrome      Priority: Medium        Past Medical History:    No past medical history on file.    Past Surgical History:    No past surgical history on file.    Family History:    No family history on file.    Medications:    famotidine (PEPCID) 20 MG tablet  albuterol (PROAIR HFA, PROVENTIL HFA, VENTOLIN HFA) 108 (90 BASE) MCG/ACT inhaler  albuterol (PROAIR HFA, PROVENTIL HFA, VENTOLIN HFA) 108 (90 BASE) MCG/ACT inhaler  albuterol (PROAIR HFA/PROVENTIL HFA/VENTOLIN HFA) 108 (90 BASE) MCG/ACT Inhaler  gabapentin (NEURONTIN) 300 MG capsule  HYDROcodone-acetaminophen (NORCO) 5-325 MG per tablet  hydrOXYzine (VISTARIL) 25 MG capsule          Physical Exam   BP:  "126/73  Pulse: 84  Temp: 97.2  F (36.2  C)  Resp: 18  Height: 160 cm (5' 3\")  Weight: 98.2 kg (216 lb 6.4 oz)  SpO2: 96 %    Gen: Vital signs reviewed  Eyes: Sclera white, pupils round  ENT: External ears and nares normal  Card: Regular rate and rhythm  Resp: No respiratory distress. Lungs clear to auscultation bilaterally  Abd: Soft, non-distended, tender to palpation throughout abdomen without guarding.  Extremities: Symmetric distal pulses.  Skin: No rashes  Neuro: Alert, speech normal.     ED Course        Procedures             Results for orders placed or performed during the hospital encounter of 07/19/23 (from the past 24 hour(s))   CBC with platelets differential    Narrative    The following orders were created for panel order CBC with platelets differential.  Procedure                               Abnormality         Status                     ---------                               -----------         ------                     CBC with platelets and d...[[id number]]  Abnormal            Final result                 Please view results for these tests on the individual orders.   Comprehensive metabolic panel   Result Value Ref Range    Sodium 139 136 - 145 mmol/L    Potassium 3.9 3.4 - 5.3 mmol/L    Chloride 104 98 - 107 mmol/L    Carbon Dioxide (CO2) 22 22 - 29 mmol/L    Anion Gap 13 7 - 15 mmol/L    Urea Nitrogen 17.8 6.0 - 20.0 mg/dL    Creatinine 0.87 0.51 - 0.95 mg/dL    Calcium 9.3 8.6 - 10.0 mg/dL    Glucose 84 70 - 99 mg/dL    Alkaline Phosphatase 100 35 - 104 U/L    AST 18 0 - 45 U/L    ALT 19 0 - 50 U/L    Protein Total 7.1 6.4 - 8.3 g/dL    Albumin 4.0 3.5 - 5.2 g/dL    Bilirubin Total 0.2 <=1.2 mg/dL    GFR Estimate 87 >60 mL/min/1.73m2   Lipase   Result Value Ref Range    Lipase 16 13 - 60 U/L   HCG qualitative   Result Value Ref Range    hCG Serum Qualitative Negative Negative   CBC with platelets and differential   Result Value Ref Range    WBC Count 11.9 (H) 4.0 - 11.0 10e3/uL    RBC " Count 4.09 3.80 - 5.20 10e6/uL    Hemoglobin 12.7 11.7 - 15.7 g/dL    Hematocrit 37.7 35.0 - 47.0 %    MCV 92 78 - 100 fL    MCH 31.1 26.5 - 33.0 pg    MCHC 33.7 31.5 - 36.5 g/dL    RDW 12.7 10.0 - 15.0 %    Platelet Count 305 150 - 450 10e3/uL    % Neutrophils 63 %    % Lymphocytes 25 %    % Monocytes 7 %    % Eosinophils 5 %    % Basophils 0 %    % Immature Granulocytes 0 %    NRBCs per 100 WBC 0 <1 /100    Absolute Neutrophils 7.5 1.6 - 8.3 10e3/uL    Absolute Lymphocytes 3.0 0.8 - 5.3 10e3/uL    Absolute Monocytes 0.8 0.0 - 1.3 10e3/uL    Absolute Eosinophils 0.6 0.0 - 0.7 10e3/uL    Absolute Basophils 0.1 0.0 - 0.2 10e3/uL    Absolute Immature Granulocytes 0.1 <=0.4 10e3/uL    Absolute NRBCs 0.0 10e3/uL   CT Abdomen Pelvis w Contrast    Narrative    EXAM: CT ABDOMEN PELVIS W CONTRAST  LOCATION: [facility]  DATE: 7/19/2023    INDICATION: lower abdominal pain  COMPARISON: None.  TECHNIQUE: CT scan of the abdomen and pelvis was performed following injection of IV contrast. Multiplanar reformats were obtained. Dose reduction techniques were used.  CONTRAST: 100mLs Isovue 370    FINDINGS:   LOWER CHEST: Mild subpleural atelectasis in the right base.    HEPATOBILIARY: There is a tiny cyst in the right lobe of the liver which needs no follow-up.    PANCREAS: Normal.    SPLEEN: Normal.    ADRENAL GLANDS: There are 2 separate low dense right adrenal nodules largest of which measures 2.3 x 1.7 cm.    KIDNEYS/BLADDER: No calculi or obstruction.    BOWEL: No inflammatory changes. Redundant colon. Bowel is of normal caliber. Appendix not seen.    LYMPH NODES: Normal.    VASCULATURE: Unremarkable.    PELVIC ORGANS: Retroflexed uterus. No free fluid or adnexal masses. Corpus luteum on the left.    MUSCULOSKELETAL: Prior L5-S1 anterior and posterior spinal fusion apparatus. No suspicious lesions.      Impression    IMPRESSION:   1.  No abnormalities are seen to explain pain.  2.  Specifically,  no inflammatory changes, renal calculi or obstruction.  3.  Uterus is midline and retroflexed with corpus luteum on the left. No free fluid.  4.  There are 2 separate low dense right adrenal nodules, largest measuring 2.3 x 1.7 cm. Suggest a follow-up, noncontrast limited adrenal CT to ensure these are adenomas.    NOTE: ABNORMAL REPORT    THE DICTATION ABOVE DESCRIBES AN ABNORMALITY FOR WHICH FOLLOW-UP IS NEEDED.        Medications   0.9% sodium chloride BOLUS (0 mLs Intravenous Stopped 7/19/23 2151)   iopamidol (ISOVUE-370) solution 500 mL (100 mLs Intravenous $Given 7/19/23 2049)   Sodium Chloride 0.9 % bag 100mL for CT scan (60 mLs Intravenous $Given 7/19/23 2049)         Consultations:  None    Social Determinants of Health:  Manages ADL's    Assessments & Plan (with Medical Decision Making)       I have reviewed the nursing notes.    I have reviewed the findings, diagnosis, plan and need for follow up with the patient.      Medical Decision Making  On arrival, vital signs are normal.  Differential presentation includes gastritis, pancreatitis, cholecystitis, diverticulitis.  Laboratory work-up ordered a CT scan of her abdomen pelvis.  Labs show a leukocytosis which patient states she has been worked up for as an outpatient by oncology.  Her CT scan does not demonstrate acute abnormalities.  I did discuss with her the adrenal nodules and the need for follow-up.  With the postprandial epigastric pain with normal lipase, I suspect more of gastritis versus gastric ulcer.  Started on Pepcid.  She has GI follow-up coming up.  She was discharged home in stable condition with proper return precautions.    Final diagnoses:   Epigastric pain   Adrenal nodule (H)         Mikael Anaya M.D.   [facility] Emergency Department     Mikael Anaya MD  07/19/23 [id number]